# Patient Record
Sex: MALE | Race: WHITE | Employment: FULL TIME | URBAN - METROPOLITAN AREA
[De-identification: names, ages, dates, MRNs, and addresses within clinical notes are randomized per-mention and may not be internally consistent; named-entity substitution may affect disease eponyms.]

---

## 2022-10-11 ENCOUNTER — OFFICE VISIT (OUTPATIENT)
Dept: ENT CLINIC | Age: 52
End: 2022-10-11
Payer: COMMERCIAL

## 2022-10-11 VITALS
WEIGHT: 226 LBS | DIASTOLIC BLOOD PRESSURE: 88 MMHG | HEIGHT: 74 IN | BODY MASS INDEX: 29 KG/M2 | SYSTOLIC BLOOD PRESSURE: 143 MMHG | HEART RATE: 78 BPM

## 2022-10-11 DIAGNOSIS — E04.1 RIGHT THYROID NODULE: ICD-10-CM

## 2022-10-11 DIAGNOSIS — M54.2 NECK PAIN: ICD-10-CM

## 2022-10-11 DIAGNOSIS — R23.2 FACIAL FLUSHING: ICD-10-CM

## 2022-10-11 DIAGNOSIS — R23.2 FACIAL FLUSHING: Primary | ICD-10-CM

## 2022-10-11 LAB — SEDIMENTATION RATE, ERYTHROCYTE: 2 MM/HR (ref 0–20)

## 2022-10-11 PROCEDURE — 99204 OFFICE O/P NEW MOD 45 MIN: CPT | Performed by: OTOLARYNGOLOGY

## 2022-10-11 RX ORDER — ATENOLOL 50 MG/1
50 TABLET ORAL DAILY
COMMUNITY
Start: 2022-01-07

## 2022-10-11 ASSESSMENT — ENCOUNTER SYMPTOMS
CONSTIPATION: 0
BLOOD IN STOOL: 0
COUGH: 0
RHINORRHEA: 0
NAUSEA: 0
BACK PAIN: 0
SINUS PAIN: 0
EYE PAIN: 0
TROUBLE SWALLOWING: 0
STRIDOR: 0
SHORTNESS OF BREATH: 0
CHEST TIGHTNESS: 0
DIARRHEA: 0
EYE DISCHARGE: 0
SINUS PRESSURE: 0
APNEA: 0
VOMITING: 0
VOICE CHANGE: 0
COLOR CHANGE: 0
CHOKING: 0
SORE THROAT: 0
FACIAL SWELLING: 0
WHEEZING: 0

## 2022-10-11 NOTE — PROGRESS NOTES
Subjective:      Patient ID: Félix Mora is a 45 y.o. male. HPI    History of Present Illness  Patient presents in consultation from Dr. martinez    CC: thyroid nodule    Comments: Henry Olmos is a(n) 45 y.o. male who presents with a 1 year history of right facial flushing. Comes and goes. Hot. Neck pain. Predominatly right sided face neck and ear. Also with thyroid nodule. Found on CT. Had ultrasound and FNA. Benign. Had TSH drawn. Normal. Had positive NGUYỄN cak in April. No rhematology visit. Seen neurology. No derm. Pain yes  Location: Right neck  Onset:  As above  Timing: waxing and waning  Otalgia:  none  Odynophagia:  none  Dysphagia:  none  Dyspnea:  none  Voice Changes:  no clear  Lumps in neck: No  Hemoptysis:  none  Fever: no  Chills:  no  Sweats:  no  Weight Loss:  stable / unchanged  Modifying Factors:  Family history of thyroid disease No     Family history of thyroid cancer No     Personal history of neck radiation No  Patient denies the following symptoms: fatigue, weight gain, weight loss, cold intolerance, heat intolerance, hair loss, dry skin, constipation, and diarrhea        Lab Studies:No results found for: WBC, HGB, HCT, MCV, PLT  No results found for: GLUCOSE, BUN, CREATININE, K, NA, CL, CALCIUM  No results found for: MG  No results found for: PHOS  No results found for: ALKPHOS, ALT, AST, BILITOT, ALBUMIN, PROT, LABGLOB  No results found for: TSH, Z8IDJHP, Z1SSIEU, THYROIDAB, FT3, T4FREE    There is no problem list on file for this patient. No past surgical history on file. No family history on file.   Social History     Socioeconomic History    Marital status: Not on file     Spouse name: Not on file    Number of children: Not on file    Years of education: Not on file    Highest education level: Not on file   Occupational History    Not on file   Tobacco Use    Smoking status: Not on file    Smokeless tobacco: Not on file   Substance and Sexual Activity    Alcohol use: Not on file Drug use: Not on file    Sexual activity: Not on file   Other Topics Concern    Not on file   Social History Narrative    Not on file     Social Determinants of Health     Financial Resource Strain: Not on file   Food Insecurity: Not on file   Transportation Needs: Not on file   Physical Activity: Not on file   Stress: Not on file   Social Connections: Not on file   Intimate Partner Violence: Not on file   Housing Stability: Not on file       DRUG/FOOD ALLERGIES: Patient has no allergy information on record. CURRENT MEDICATIONS  Prior to Admission medications    Not on File      Review of Systems   Constitutional:  Negative for activity change, appetite change, chills, fatigue and fever. HENT:  Negative for congestion, dental problem, drooling, ear discharge, ear pain, facial swelling, hearing loss, mouth sores, nosebleeds, postnasal drip, rhinorrhea, sinus pressure, sinus pain, sneezing, sore throat, tinnitus, trouble swallowing and voice change. Eyes:  Negative for pain, discharge and visual disturbance. Respiratory:  Negative for apnea, cough, choking, chest tightness, shortness of breath, wheezing and stridor. Cardiovascular:  Negative for palpitations. Gastrointestinal:  Negative for blood in stool, constipation, diarrhea, nausea and vomiting. Endocrine: Negative for cold intolerance, heat intolerance, polydipsia, polyphagia and polyuria. Musculoskeletal:  Negative for back pain, gait problem, neck pain and neck stiffness. Skin:  Negative for color change, pallor, rash and wound. Allergic/Immunologic: Negative for environmental allergies, food allergies and immunocompromised state. Neurological:  Negative for dizziness, facial asymmetry, speech difficulty, light-headedness, numbness and headaches. Hematological:  Negative for adenopathy. Does not bruise/bleed easily. Psychiatric/Behavioral:  Negative for agitation, confusion, self-injury and sleep disturbance.  The patient is not nervous/anxious. Objective:   Physical Exam  Constitutional:       Appearance: He is well-developed. He is not ill-appearing. HENT:      Head: Normocephalic and atraumatic. Not macrocephalic and not microcephalic. No raccoon eyes, Oneill's sign, abrasion, contusion, right periorbital erythema, left periorbital erythema or laceration. Hair is normal.      Jaw: No trismus. Salivary Glands: Right salivary gland is not diffusely enlarged. Left salivary gland is not diffusely enlarged. Right Ear: Hearing, tympanic membrane and external ear normal. No decreased hearing noted. No drainage, swelling or tenderness. No middle ear effusion. No mastoid tenderness. Tympanic membrane is not perforated, retracted or bulging. Tympanic membrane has normal mobility. Left Ear: Hearing, tympanic membrane and external ear normal. No decreased hearing noted. No drainage, swelling or tenderness. No middle ear effusion. No mastoid tenderness. Tympanic membrane is not perforated, retracted or bulging. Tympanic membrane has normal mobility. Ears:      Rios exam findings: Does not lateralize. Right Rinne: AC > BC. Left Rinne: AC > BC. Nose: No nasal deformity, septal deviation, laceration, mucosal edema or rhinorrhea. Right Nostril: No epistaxis. Left Nostril: No epistaxis. Right Turbinates: Not enlarged. Left Turbinates: Not enlarged. Right Sinus: No maxillary sinus tenderness or frontal sinus tenderness. Left Sinus: No maxillary sinus tenderness or frontal sinus tenderness. Mouth/Throat:      Lips: No lesions. Mouth: Mucous membranes are not pale, not dry and not cyanotic. No lacerations or oral lesions. Dentition: Normal dentition. No dental caries or dental abscesses. Tongue: No lesions. Palate: No mass. Pharynx: Uvula midline. No oropharyngeal exudate, posterior oropharyngeal erythema or uvula swelling.       Tonsils: No tonsillar abscesses. Eyes:      General: Lids are normal. Lids are everted, no foreign bodies appreciated. Right eye: No discharge. Left eye: No discharge. Extraocular Movements:      Right eye: Normal extraocular motion and no nystagmus. Left eye: Normal extraocular motion and no nystagmus. Conjunctiva/sclera:      Right eye: No chemosis or exudate. Left eye: No chemosis or exudate. Neck:      Thyroid: No thyroid mass or thyromegaly. Vascular: Normal carotid pulses. Trachea: Trachea normal. No tracheal deviation. Cardiovascular:      Rate and Rhythm: Normal rate and regular rhythm. Pulmonary:      Effort: No tachypnea, bradypnea or respiratory distress. Breath sounds: No stridor. Musculoskeletal:      Right shoulder: Normal range of motion. Left shoulder: Normal range of motion. Cervical back: Neck supple. Lymphadenopathy:      Head:      Right side of head: No submental, submandibular, tonsillar, preauricular, posterior auricular or occipital adenopathy. Left side of head: No submental, submandibular, tonsillar, preauricular, posterior auricular or occipital adenopathy. Cervical:      Right cervical: No superficial, deep or posterior cervical adenopathy. Left cervical: No superficial, deep or posterior cervical adenopathy. Skin:     Findings: No bruising, erythema, laceration or lesion. Neurological:      Mental Status: He is alert and oriented to person, place, and time. Psychiatric:         Speech: Speech normal.         Behavior: Behavior normal.     CT SOFT TISSUE NECK W CONTRAST    Anatomical Region Laterality Modality   Neck -- Computed Tomography     Impression        Large hypodense bilobed mass or two adjacent masses in the right thyroid lobe, concerning for possible neoplasm. Recommend thyroid ultrasound for further characterization.     Narrative    HISTORY:  Neck mass, initial workup   right neck mass  Neck mass, initial workup, right neck mass, Mass of right side of neck,   Localized swelling, mass and lump, neck   COMPARISON:  None   TECHNIQUE:   Multiplanar CT images of the neck   NOTE:  If there are questions about the content of this report, please contact [address] [place] [address] radiology by calling [phone]     FINDINGS:   ORBITS / POSTERIOR FOSSA :  Visualized portions are grossly unremarkable   FACIAL SOFT TISSUES / MUSCLES OF MASTICATION / PARAPHARYNGEAL SPACES:  Unremarkable   PAROTID GLANDS:  Unremarkable   SUBMANDIBULAR GLANDS:  Unremarkable   NASOPHARYNX / OROPHARYNX:  Unremarkable   HYPOPHARYNX:  Unremarkable   LARYNX AND VOCAL CORDS:  Unremarkable       THYROID:  Dominant 4.3 x 2.5 x 3.2 cm hypodense bilobed mass or two adjacent masses in the right thyroid lobe. SUPERIOR MEDIASTINUM / BRACHIAL PLEXUS:  Unremarkable   LYMPH NODES:  Unremarkable.   No enlarged lymph nodes   PARAVERTEBRAL MUSCULATURE:  Unremarkable   VESSELS:  Unremarkable       PARANASAL SINUSES:  Unremarkable     PETROUS TEMPORAL BONES:  Unremarkable   TRACHEA / LUNG APICES:  Unremarkable   CERVICAL SPINE / SELLA:  Unremarkable   OTHER: None  Procedure Note    Ajit Diamond MD - 09/16/2022   Formatting of this note might be different from the original.   HISTORY:  Neck mass, initial workup   right neck mass  Neck mass, initial workup, right neck mass, Mass of right side of neck,   Localized swelling, mass and lump, neck   COMPARISON:  None   TECHNIQUE:   Multiplanar CT images of the neck   NOTE:  If there are questions about the content of this report, please contact [facility] by calling [phone]     FINDINGS:   ORBITS / POSTERIOR FOSSA :  Visualized portions are grossly unremarkable   FACIAL SOFT TISSUES / MUSCLES OF MASTICATION / PARAPHARYNGEAL SPACES:  Unremarkable   PAROTID GLANDS:  Unremarkable   SUBMANDIBULAR GLANDS:  Unremarkable   NASOPHARYNX / OROPHARYNX:  Unremarkable   HYPOPHARYNX:  Unremarkable   LARYNX AND VOCAL CORDS: Unremarkable       THYROID:  Dominant 4.3 x 2.5 x 3.2 cm hypodense bilobed mass or two adjacent masses in the right thyroid lobe. SUPERIOR MEDIASTINUM / BRACHIAL PLEXUS:  Unremarkable   LYMPH NODES:  Unremarkable. No enlarged lymph nodes   PARAVERTEBRAL MUSCULATURE:  Unremarkable   VESSELS:  Unremarkable       PARANASAL SINUSES:  Unremarkable     PETROUS TEMPORAL BONES:  Unremarkable   TRACHEA / LUNG APICES:  Unremarkable   CERVICAL SPINE / SELLA:  Unremarkable   OTHER: None     IMPRESSION       Large hypodense bilobed mass or two adjacent masses in the right thyroid lobe, concerning for possible neoplasm. Recommend thyroid ultrasound for further characterization. [facility]   09/26/2022  Component      GLUCOSE, RANDOM   TSH-3RD GENERATION   GLUCOSE, RANDOM EXTERNAL LAB   HEMOGLOBIN A1C   EST AVERAGE GLUCOSE   HEMOGLOBIN A1C EXTERNAL LAB   EST AVERAGE GLUCOSE EXTERNAL LAB   TSH     Component 09/26/2022 09/01/2022 08/10/2022 04/22/2022 04/16/2022 04/16/2022 03/25/2022 03/19/2022 01/24/2022 01/24/2022 08/04/2021 01/18/2021 01/18/2021                     GLUCOSE, RANDOM -- 92 -- -- 132 High     -- 98 126 High     -- 83 -- -- 93 Load older lab results   TSH-3RD GENERATION -- -- -- -- -- -- -- -- -- -- -- -- -- Load older lab results   GLUCOSE, RANDOM EXTERNAL LAB -- -- -- -- -- -- -- -- -- -- -- -- -- Load older lab results   HEMOGLOBIN A1C -- -- -- 5.4 -- -- -- -- 5.1 -- -- 5.5 -- Load older lab results   EST AVERAGE GLUCOSE -- -- -- 108 -- -- -- -- 100 -- -- 111 --    HEMOGLOBIN A1C EXTERNAL LAB -- -- 5.1 -- -- -- -- -- -- -- 5.1 -- --    EST AVERAGE GLUCOSE EXTERNAL LAB -- -- 94 -- -- -- -- -- -- -- 86 -- --    TSH 0.874                        Assessment:       Diagnosis Orders   1. Facial flushing  Sedimentation Rate    C-Reactive Protein    NGUYỄN Davila - Juan Burgess MD, Dermatology, Terence Shin MD, Rheumatology, [facility]      2.  Neck pain Sedimentation Rate    C-Reactive Protein    NGUYỄN      3. Right thyroid nodule                Plan:      Lab work ordered. Referral to Rheum and Derm. Reassured on nodule. Repeat ultrasound in one year. Follow up as neede.d     Total time spent with the patient reviewing charts, lab tests, images, outside medical visits, history, examination, answering  questions and formulating treatment plan was 54 minutes.     New 2 - 20 Return 2 - 10  New 3 - 30 Return 3 - 15  New 4 - 45 Return 4 - 25  New 5 - 60 Return 5 - 40+         José Dove MD

## 2022-10-12 ENCOUNTER — TELEPHONE (OUTPATIENT)
Dept: ENT CLINIC | Age: 52
End: 2022-10-12

## 2022-10-12 LAB
ANTI-NUCLEAR ANTIBODY (ANA): NEGATIVE
C-REACTIVE PROTEIN: <3 MG/L (ref 0–5.1)

## 2022-10-12 NOTE — TELEPHONE ENCOUNTER
----- Message from Levar Solorzano MD sent at 10/12/2022 12:16 PM EDT -----  Please let patient know his autoimmune markers were normal. Not likely autoimmune disease. Recommend discussing again with neurology and dermatology for fluyshing.

## 2022-10-19 ENCOUNTER — OFFICE VISIT (OUTPATIENT)
Dept: DERMATOLOGY | Age: 52
End: 2022-10-19
Payer: COMMERCIAL

## 2022-10-19 DIAGNOSIS — R23.2 FACIAL FLUSHING: ICD-10-CM

## 2022-10-19 DIAGNOSIS — L81.4 LENTIGINES: ICD-10-CM

## 2022-10-19 DIAGNOSIS — D22.5 MULTIPLE BENIGN MELANOCYTIC NEVI OF UPPER EXTREMITY, LOWER EXTREMITY, AND TRUNK: ICD-10-CM

## 2022-10-19 DIAGNOSIS — L21.8 OTHER SEBORRHEIC DERMATITIS: Primary | ICD-10-CM

## 2022-10-19 DIAGNOSIS — D22.60 MULTIPLE BENIGN MELANOCYTIC NEVI OF UPPER EXTREMITY, LOWER EXTREMITY, AND TRUNK: ICD-10-CM

## 2022-10-19 DIAGNOSIS — D22.70 MULTIPLE BENIGN MELANOCYTIC NEVI OF UPPER EXTREMITY, LOWER EXTREMITY, AND TRUNK: ICD-10-CM

## 2022-10-19 PROCEDURE — 99204 OFFICE O/P NEW MOD 45 MIN: CPT | Performed by: INTERNAL MEDICINE

## 2022-10-19 RX ORDER — KETOCONAZOLE 20 MG/ML
SHAMPOO TOPICAL
Qty: 120 ML | Refills: 2 | Status: SHIPPED | OUTPATIENT
Start: 2022-10-19

## 2022-10-19 NOTE — PROGRESS NOTES
ketoconazole (NIZORAL) 2 % shampoo Use to wash the face 3 times weekly. Leave on the wet skin for 5 minutes prior to rinsing. 120 mL 2    atenolol (TENORMIN) 50 MG tablet Take 50 mg by mouth daily           Physical Examination   No acute distress. Mood clear/affect appropriate. Alert and oriented. Mucous membranes moist.  Sclera anicteric. Full body skin exam was conducted to include the scalp, face, lips/teeth, lids/conjunctiva, ears, neck, chest, abdomen, back, buttock, right and left hands and forearms, right and left leg and feet and was normal with the following exceptions:   -No abnormal flushing on exam today; photo from phone of prior flare shows bright red, non-scaly patch on R ear   - Thin pink plaques with white scale on bilateral temples, R cheek, forehead  - On trunk and bilateral upper and lower extremities, there are multiple well-circumscribed, homogenous tan to brown macules and papules   - Multiple tan to light brown macules on face, shoulders and arms in a photo-distributed pattern        Assessment and Plan     1. Other seborrheic dermatitis, face--not controlled  -Start washing with:  - ketoconazole (NIZORAL) 2 % shampoo; Use to wash the face 3 times weekly. Leave on the wet skin for 5 minutes prior to rinsing. Dispense: 120 mL; Refill: 2  -Start bland skin care with good moisturizer daily to face    2.  Facial flushing, R side of face/post neck--recurrent since 2021  - Ddx includes Anthony syndrome (favored; can have flushing +/- hyperhidrosis) vs. Autonomic neuropathy vs. Less likely mastocytosis, neuroendocrine cancer or other  - Although Anthony syndrome is mostly described s/p parotid surgery, it has rarely been described as being due to damage to the main sympathetic nerve chain in the neck  -Pt has had extensive work up with labs that have been negative including CBC, ESR/CRP (CRP was elevated in 3/2022), 5-HIAA, Catecholamines, metanephrines, RF,   -NGUYỄN + 1:160 speckled  -Pt's CT neck from 9/2022 revealed thryoid nodule, but commented that parotid glands were \"unremarkable\". 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 \" Will discuss with Dr. Carie Kurtz as a possibility of nodule impinging in this region  -Would add tryptase lab to complete work up  -Agree with referral to Neurology    3. Multiple benign melanocytic nevi of upper extremity, lower extremity, and trunk  - Benign, reassurance  - Counseled on importance of daily sun protection (Broad spectrum, SPF >30), monthly self skin exams  - Reviewed ABCDEs of melanoma  - Sun screen hand out provided    4. Lentigines  -Benign, reassurance   - Reviewed relationship with sun exposure  - Rec daily sunscreen with SPF 30, broad spectrum       Return in about 6 weeks (around 11/30/2022). Note is transcribed using voice recognition software. Inadvertent computerized transcription errors may be present. 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

## 2022-10-19 NOTE — PATIENT INSTRUCTIONS
Thank you for visiting [facility] today! Please follow the instructions below as we discussed in clinic:      Start washing face with ketoconazole shampoo three times per week   Stop dial soap and switch to washes from list below    [facility] SKIN CARE LIST    Inflammatory skin diseases can flare and become irritated due to fragrances and preservatives found in personal care products. Please try to switch your personal care products to ones found on the list below. These products are found online ([employer]) and often at [facility] or [facility]. Shampoos: Free and Clear Shampoo, [employer] Hypoallergenics Essence [employer] Group Wash Hair + Body \"Big Softie\" Shampoo  Moisturizer: Cerave Moisturizing Cream, Cetaphil cream, Vaseline   Laundry Detergent:  All Free Clear Detergent Powder, Tide Free & Gentle Powdered Detergent, 7th Generation Laundry Detergent  Face wash: Cerave Hydrating Facial Cleanser, Cetaphil Gentle Skin Cleanser,   Facial moisturizers: Aveeno Daily Moisturizing Lotion with Sunscreen SPF 15, Cetaphil Daily Facial Moisturizer, Cerave AM Facial Moisturizing Lotion  Sunscreen: Vanicream sunscreen, La Roche-Posay Toleriane Double Repair Face Moisturizer SPF 30  Deodorant: Vanicream deodorant, Crystal body deodorant stick  Make-up: Bare Minerals      SUNSCREENS: UVA and UVB PROTECTION    Sunlight consists of two types of light that can cause or worsen most skin problems:    UVA (ultraviolet A)  UVB (ultraviolet B)   Causes brown spots/sun spots Causes skin cancer   Causes wrinkles Causes sunburn   Causes aging Responsible for tanning    Worsens rosacea Strongest in summer    Less variation with seasons Peak hours 10am-2pm   All year round  SPF rates UVB protection    All day strong    No rating system available     Passes through glass and clouds      *Sunscreens that block both UVA and UVB light contain:  Zinc Oxide (should contain at least 5% zinc oxide)  Titanium Dioxide  Parsol or Avobenzone (additives improve This has been requested in multiple encounters already.   stability of Avobenzone)  There are other UVA blocking ingredients but they are not as complete as these three. Tips/Suggestions  1) Always use sunscreens with SPF of 30 or higher  2) Make sure the sunscreen has zinc oxide or other UVA block such as Helioplex or Anthelios in product  3) Remember there is no \"safe UV light:. ..so there is no such thing as a safe suntan. 4) Apply sunscreen daily (even in winter and on cloudy days) and reapply every 2 to 4 hours depending on activity. 5) Approximately one ounce (a shot glass) is necessary to adequately cover the entire body. 6) Approximately one teaspoon is necessary to adequately cover the face    TINTED SUNSCREENS  - La Roche Posay. Anthelios mineral tinted sunscreen. SPF. 50  Avene. [place] UV Mineral Multi-Defense Tinted Sunscreen SPF 50+   Avene. Mineral Tinted Compact SPF 48. Tizo 3 facial primer tinted SPF 40  Eltamd Uv Glow Tinted Broad-Spectrum Spf 36  Eltamd Uv Restore Tinted Broad-Spectrum Spf 40   Eltamd Uv Physical Broad-Spectrum Spf 41   Eltamd Uv Elements Broad-Spectrum Spf 44   Dermablend. Cover Creme. High-performance cream foundation for maximum coverage SPF 30   Vichy. [employer] Joselyn Tinted 100% Mineral Sunscreen SPF 60%. Vichy. [address] CORRECTIVE FLUID [facility]. [address]. Isdin. Eryfotona Ageless. Ultralight tinted mineral sunscreen. Isdin. Isdinceutics Mineral Brush.    Supergoop Mineral CC cream (Comes in several shades, friendly for pigmented skin)  CoTz Flawless Complexion SPF 50 tinted  CoTz Face Prime and Protect SPF 40 tinted    CHEMICAL SUNSCREEN  - La Roche-Posay Anthelios Melt-in Milk Body Face Sunscreen Lotion Broad Spectrum SPF 60 or 100   - La Roche-Posay ANTHELIOS COOLING WATER SUNSCREEN LOTION SPF 60  - La Roche-Posay ANTHELIOS LOTION SPRAY SUNSCREEN SPF 60  - La Roche-Posay ANTHELIOS ACTIVEWEAR SPORT SUNSCREEN LOTION SPF 60    MOISTURIZER WITH CHEMICAL SUNSCREEN NON-COMEDOGENIC  - CeraVe Facial Moisturizing Lotion AM with Sunscreen, Broad Spectrum SPF 30: Ceramides/niacinamide/HA. - CeraVe Ultra-Light Moisturizing Lotion with Sunscreen, Broad Spectrum SPF 30. Ceramides/HA   - La Roche-Posay Toleriane Double Repair Moisturizer SPF 27. Ceramine/Niacinamide. Very dry skin  - Eucerin Daily Protection. Moisturizing Face Lotion Sunscreen SPF 30  - Cetaphil® PRO Dermacontrol. Oil Absorbing Moisturizer SPF 30: Very oily skin  - Cetaphil® Daily Facial Moisturizer SPF 48: Dry skin  - Neutrogena Visibly Even Daily Moisturizer with Sunscreen, Broad Spectrum SPF [place] Shield Water Gel Sunscreen, Broad Spectrum SPF 25  - Aveeno Positively Radiant Daily Moisturizer, Broad Spectrum SPF 30    MOISTURIZER WITH PHYSICAL SUNSCREEN. NON-COMEDOGENIC  - Neutrogena. Ultra-Calming Daily Luz Maria Archer MD UV Physical Broad Spectrum SPF 39. Water resistant.  - La Roche-Posay Anthelios SPF 50 Ultra Light Mineral Sunscreen Fluid. Water resistant  - La Roche-Posay Anthelios 100% Mineral Sunscreen Moisturizer with Hyaluronic Acid  - COOLA Mineral Face SPF 30 Matte Tint Moisturizer. Water resistant  - Avene SPF 50+ Mineral Light Hydrating Sunscreen Lotion. Water resistant. - CeraVe Skin Renewing Day Cream Broad Spectrum SPF 27  - Aveeno Ultra-Calming Daily Moisturizer Broad Spectrum SPF 30    The following are some examples of vendors of sun protective clothing:  - Coolibar (www.coolibar. com)  - Sun Precautions (www.sunprecautions. com)  - Sunday Afternoons (www.sundayafternoons. com)  Blanchie Sane (www.wallaroohats. com)  - Tereso life (www.cabanalife. Bottle)    Remember the best sunscreen is whichever one you will wear every day!

## 2022-10-24 ENCOUNTER — PATIENT MESSAGE (OUTPATIENT)
Dept: DERMATOLOGY | Age: 52
End: 2022-10-24

## 2022-11-02 DIAGNOSIS — R23.2 FACIAL FLUSHING: Primary | ICD-10-CM

## 2022-11-15 DIAGNOSIS — R23.2 FACIAL FLUSHING: ICD-10-CM

## 2022-11-17 LAB — TRYPTASE: 3.6 UG/L

## 2023-09-14 ENCOUNTER — HOSPITAL ENCOUNTER (EMERGENCY)
Age: 53
Discharge: HOME | End: 2023-09-15
Attending: EMERGENCY MEDICINE
Payer: COMMERCIAL

## 2023-09-14 VITALS
RESPIRATION RATE: 18 BRPM | DIASTOLIC BLOOD PRESSURE: 91 MMHG | OXYGEN SATURATION: 97 % | SYSTOLIC BLOOD PRESSURE: 149 MMHG | HEART RATE: 70 BPM | TEMPERATURE: 98.1 F

## 2023-09-14 DIAGNOSIS — R00.2 PALPITATIONS: Primary | ICD-10-CM

## 2023-09-14 LAB
ALBUMIN SERPL-MCNC: 4.6 G/DL (ref 3.4–5)
ALBUMIN/GLOB SERPL: 1.8 {RATIO} (ref 1.1–2.2)
ALP SERPL-CCNC: 52 U/L (ref 40–129)
ALT SERPL-CCNC: 24 U/L (ref 10–40)
ANION GAP SERPL CALCULATED.3IONS-SCNC: 11 MMOL/L (ref 3–16)
AST SERPL-CCNC: 25 U/L (ref 15–37)
BASOPHILS # BLD: 0.1 K/UL (ref 0–0.2)
BASOPHILS NFR BLD: 1 %
BILIRUB SERPL-MCNC: <0.2 MG/DL (ref 0–1)
BUN SERPL-MCNC: 25 MG/DL (ref 7–20)
CALCIUM SERPL-MCNC: 9.3 MG/DL (ref 8.3–10.6)
CHLORIDE SERPL-SCNC: 101 MMOL/L (ref 99–110)
CHP ED QC CHECK: 108
CO2 SERPL-SCNC: 28 MMOL/L (ref 21–32)
CREAT SERPL-MCNC: 1.1 MG/DL (ref 0.9–1.3)
DEPRECATED RDW RBC AUTO: 14.5 % (ref 12.4–15.4)
EOSINOPHIL # BLD: 0.1 K/UL (ref 0–0.6)
EOSINOPHIL NFR BLD: 1.2 %
GFR SERPLBLD CREATININE-BSD FMLA CKD-EPI: >60 ML/MIN/{1.73_M2}
GLUCOSE BLD-MCNC: 108 MG/DL (ref 70–99)
GLUCOSE SERPL-MCNC: 105 MG/DL (ref 70–99)
HCT VFR BLD AUTO: 40.4 % (ref 40.5–52.5)
HGB BLD-MCNC: 14.2 G/DL (ref 13.5–17.5)
LYMPHOCYTES # BLD: 1.4 K/UL (ref 1–5.1)
LYMPHOCYTES NFR BLD: 20.3 %
MCH RBC QN AUTO: 30.4 PG (ref 26–34)
MCHC RBC AUTO-ENTMCNC: 35 G/DL (ref 31–36)
MCV RBC AUTO: 86.9 FL (ref 80–100)
MONOCYTES # BLD: 0.5 K/UL (ref 0–1.3)
MONOCYTES NFR BLD: 7.5 %
NEUTROPHILS # BLD: 4.9 K/UL (ref 1.7–7.7)
NEUTROPHILS NFR BLD: 70 %
PERFORMED ON: ABNORMAL
PLATELET # BLD AUTO: 171 K/UL (ref 135–450)
PMV BLD AUTO: 7.8 FL (ref 5–10.5)
POTASSIUM SERPL-SCNC: 4.4 MMOL/L (ref 3.5–5.1)
PROT SERPL-MCNC: 7.1 G/DL (ref 6.4–8.2)
RBC # BLD AUTO: 4.65 M/UL (ref 4.2–5.9)
SODIUM SERPL-SCNC: 140 MMOL/L (ref 136–145)
WBC # BLD AUTO: 6.9 K/UL (ref 4–11)

## 2023-09-14 PROCEDURE — 36415 COLL VENOUS BLD VENIPUNCTURE: CPT

## 2023-09-14 PROCEDURE — 85025 COMPLETE CBC W/AUTO DIFF WBC: CPT

## 2023-09-14 PROCEDURE — 84439 ASSAY OF FREE THYROXINE: CPT

## 2023-09-14 PROCEDURE — 93005 ELECTROCARDIOGRAM TRACING: CPT | Performed by: EMERGENCY MEDICINE

## 2023-09-14 PROCEDURE — 84443 ASSAY THYROID STIM HORMONE: CPT

## 2023-09-14 PROCEDURE — 99284 EMERGENCY DEPT VISIT MOD MDM: CPT

## 2023-09-14 PROCEDURE — 80053 COMPREHEN METABOLIC PANEL: CPT

## 2023-09-14 ASSESSMENT — PAIN - FUNCTIONAL ASSESSMENT: PAIN_FUNCTIONAL_ASSESSMENT: NONE - DENIES PAIN

## 2023-09-15 LAB
EKG ATRIAL RATE: 78 BPM
EKG DIAGNOSIS: NORMAL
EKG P AXIS: 70 DEGREES
EKG P-R INTERVAL: 150 MS
EKG Q-T INTERVAL: 376 MS
EKG QRS DURATION: 96 MS
EKG QTC CALCULATION (BAZETT): 428 MS
EKG R AXIS: 39 DEGREES
EKG T AXIS: 43 DEGREES
EKG VENTRICULAR RATE: 78 BPM
T4 FREE SERPL-MCNC: 1.4 NG/DL (ref 0.9–1.8)
TSH SERPL DL<=0.005 MIU/L-ACNC: 1.48 UIU/ML (ref 0.27–4.2)

## 2023-09-15 ASSESSMENT — ENCOUNTER SYMPTOMS
NAUSEA: 0
COLOR CHANGE: 0
VOMITING: 0
EYES NEGATIVE: 1
ABDOMINAL PAIN: 0
CHEST TIGHTNESS: 0
RHINORRHEA: 0
SHORTNESS OF BREATH: 0
SORE THROAT: 0
COUGH: 0

## 2023-09-15 NOTE — DISCHARGE INSTRUCTIONS
You were seen in the emergency department for sensation of palpitations. Your palpitations are most likely caused by something called premature ventricular contraction. These are normal and do not require any emergent intervention. However, we did send blood test to make sure that your thyroid hormone has not become elevated as if you are frequently having extra beats, this could be related to your thyroid hormone. He should follow this up in [address] [place] [address] and with your regular doctor. Return to the emergency department for any chest pain or shortness of breath. Otherwise, call your regular doctor and schedule follow-up appointment soon as possible.